# Patient Record
Sex: FEMALE | Race: WHITE
[De-identification: names, ages, dates, MRNs, and addresses within clinical notes are randomized per-mention and may not be internally consistent; named-entity substitution may affect disease eponyms.]

---

## 2020-11-06 ENCOUNTER — HOSPITAL ENCOUNTER (EMERGENCY)
Dept: HOSPITAL 41 - JD.ED | Age: 50
Discharge: HOME | End: 2020-11-06
Payer: MEDICARE

## 2020-11-06 DIAGNOSIS — F17.210: ICD-10-CM

## 2020-11-06 DIAGNOSIS — M54.41: Primary | ICD-10-CM

## 2020-11-06 DIAGNOSIS — Z88.6: ICD-10-CM

## 2020-11-06 PROCEDURE — 99283 EMERGENCY DEPT VISIT LOW MDM: CPT

## 2020-11-06 PROCEDURE — 96372 THER/PROPH/DIAG INJ SC/IM: CPT

## 2020-11-06 NOTE — EDM.PDOC
ED HPI GENERAL MEDICAL PROBLEM





- General


Chief Complaint: Back Pain or Injury


Stated Complaint: [facility]


Time Seen by Provider: 11/06/20 13:20


Source of Information: Reports: Patient, RN Notes Reviewed


History Limitations: Reports: No Limitations





- History of Present Illness


INITIAL COMMENTS - FREE TEXT/NARRATIVE: 





Patient is a 50-year-old female who presents from the [facility] 

service for the evaluation of her right hip pain.  Patient notes that 9 AM this 

morning, she was coming down the stairs at her daughter's house, when she had 

sudden onset right hip pain.  She states that she went and laid immediately on 

the couch, has not been able to move much since.  She states that she took some 

Tylenol with codeine at home, along with a tablet of gabapentin, but notes that 

she has not gained much relief from either of this.  States that the pain starts

in her right hip and hurts all the way down her right leg.  She has never 

experienced pain like this before ever.  She states she does have history of 

possible restless legs versus neuropathy versus sciatic dysfunction in nature.  

She has recently moved up here from [place] and has not set up primary care at 

this time.  She is also complaining of some numbness in her right foot.  Patient

denies any other sick-like symptoms, fever/chills, cough/shortness of breath, 

nausea/vomiting/diarrhea.


  ** Right Hip


Pain Score (Numeric/FACES): 10





- Related Data


                                    Allergies











Allergy/AdvReac Type Severity Reaction Status Date / Time


 


ketorolac [From Toradol] Allergy  Swelling Verified 11/06/20 13:17


 


naproxen Allergy  Swelling Verified 11/06/20 13:17











Home Meds: 


                                    Home Meds





Orphenadrine [Norflex] 100 mg PO BID PRN #20 tab 11/06/20 [Rx]


predniSONE 20 mg PO ASDIRECTED #15 tab 11/06/20 [Rx]











Past Medical History


Musculoskeletal History: Reports: Other (See Below)


Other Musculoskeletal History: sciatic vs restless legs vs neuropathy


Endocrine/Metabolic History: Reports: Other (See Below)


Other Endocrine/Metabolic History: peripheral neuropathy





Social & Family History





- Tobacco Use


Tobacco Use Status *Q: Current Every Day Tobacco User


Years of Tobacco use: 30


Packs/Tins Daily: 0.2





ED ROS GENERAL





- Review of Systems


Review Of Systems: Comprehensive ROS is negative, except as noted in HPI.





ED EXAM,LOWER BACK PAIN/INJURY





- Physical Exam


Exam: See Below


Exam Limited By: No Limitations


General Appearance: Alert, WD/WN, No Apparent Distress (Patient is tearful at 

time of exam, and states that she is in quite a bit of pain and seems to be a 

little overly dramatic.)


Respiratory/Chest: No Respiratory Distress, Lungs Clear, Normal Breath Sounds, 

No Accessory Muscle Use, Chest Non-Tender


Cardiovascular: Normal Peripheral Pulses, Regular Rate, Rhythm, No Murmur


Back Exam: Normal Inspection, Full Range of Motion


Extremities: Normal Inspection, Limited Range of Motion (of right leg d/t pain)


Neurological: Alert, Normal Mood/Affect, Normal Dorsiflexion, Normal Plantar 

Flexion, No Motor/Sensory Deficits, Straight Leg Raise (R).  No: Straight Leg 

Raise (L), Saddle Anesthesia


Psychiatric: Normal Affect, Normal Mood


Skin Exam: Warm, Dry, Intact, Normal Color





Course





- Vital Signs


Last Recorded V/S: 


                                Last Vital Signs











Temp  97.3 F   11/06/20 13:13


 


Pulse  64   11/06/20 13:13


 


Resp  16   11/06/20 13:13


 


BP  182/89 H  11/06/20 13:13


 


Pulse Ox  95   11/06/20 13:13














- Orders/Labs/Meds


Meds: 


Medications














Discontinued Medications














Generic Name Dose Route Start Last Admin





  Trade Name Angela  PRN Reason Stop Dose Admin


 


Dexamethasone  10 mg  11/06/20 14:41 





  Decadron  IM  11/06/20 14:42 





  ONETIME ONE  


 


Diphenhydramine HCl  25 mg  11/06/20 13:35  11/06/20 14:03





  Benadryl  IM  11/06/20 13:36  25 mg





  ONETIME ONE   Administration


 


Ketorolac Tromethamine  60 mg  11/06/20 13:33  11/06/20 14:04





  Toradol  IM  11/06/20 13:34  60 mg





  ONETIME ONE   Administration


 


Orphenadrine Citrate  100 mg  11/06/20 13:33  11/06/20 14:03





  Norflex  PO  11/06/20 13:34  100 mg





  ONETIME ONE   Administration














- Re-Assessments/Exams


Free Text/Narrative Re-Assessment/Exam: 





11/06/20 13:41


Patient presents to the ED for evaluation of her sudden onset hip pain.  I do 

believe this is sciatic pain in nature.  Patient states that she has an allergy 

to Toradol, but states she thinks that her reaction was swelling, along with 

naproxen.  At this time since she is already taken Tylenol with codeine, and a 

gabapentin we will do a 60 mg injection of Toradol, and give her Benadryl for 

suspected allergic reaction, along with Norflex for muscle relaxer purposes.  

Patient will likely get a prescription for prednisone and we will have her 

follow-up with a primary care provider of choice.





Departure





- Departure


Time of Disposition: 14:55


Disposition: Home, Self-Care 01


Condition: Good


Clinical Impression: 


Lower back pain


Qualifiers:


 Chronicity: acute Back pain laterality: right Sciatica presence: with sciatica 

Sciatica laterality: sciatica of right side Qualified Code(s): M54.41 - Lumbago 

with sciatica, right side








- Discharge Information


*PRESCRIPTION DRUG MONITORING PROGRAM REVIEWED*: No


*COPY OF PRESCRIPTION DRUG MONITORING REPORT IN PATIENT DAMARIS: No


Prescriptions: 


Orphenadrine [Norflex] 100 mg PO BID PRN #20 tab


 PRN Reason: Spasms


predniSONE 20 mg PO ASDIRECTED #15 tab


Instructions:  Radicular Pain, Back Exercises, Easy-to-Read


Referrals: 


PCP,Not In Area [Primary Care Provider] - 


Forms:  ED Department Discharge


Additional Instructions: 


You have been evaluated in the ED for your right hip pain that radiates down 

your right leg.





It is likely that your pain is due to an inflammation of the sciatic nerve.  You

were given a combination of intramuscular medications and a p.o. muscle relaxer 

for initial management today, this did seem to help provide a little relief of 

your pain.  You were also given an IM injection of dexamethasone for further 

inflammation relief of your sciatic nerve.  Continuing treatment for this will 

be steroids, you have been given a prescription for prednisone, please take as 

directed.





Please use ice/heat as tolerated to the affected area.  





You indicated that you have Tylenol with codeine tablets at home, please use 

these sparingly for pain not relieved by Tylenol alone.  I would recommend that 

you take 500 mg Tylenol every 6 hours as needed for pain relief.  Do not exceed 

more than 4000 mg Tylenol in a 24-hour time span.





If you do not already have a primary care provider set up in this area; as you 

have recently moved, I suggest that you do so as soon as you can.  Please call 

our clinic at [phone] to set up with any family practice provider of your 

choice.  The emergency room is not an appropriate place to manage your chronic 

medical issues.





Please return to ED if your symptoms should change or worsen.








Sepsis Event Note (ED)





- Evaluation


Sepsis Screening Result: No Definite Risk





- Focused Exam


Vital Signs: 


                                   Vital Signs











  Temp Pulse Resp BP Pulse Ox


 


 11/06/20 13:13  97.3 F  64  16  182/89 H  95